# Patient Record
(demographics unavailable — no encounter records)

---

## 2024-12-02 NOTE — HISTORY OF PRESENT ILLNESS
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 Chronic headache disorder, tension-type.  Since last visit, after transitioning to LEV ER 750mg BID, no breakthrough seizures, but he says it causes drowsiness and upset stomach, he would like to try lower dose of 500mg BID, but would also request to switch to brand Keppra XR, because in the past generic levetiracetam causes similar adverse effects which had subsided when he switched to Keppra brand.  Current AEDs: LEV ER 750mg BID  Previous AEDs: Aptiom PHT VPA CBZ LEV OXC Xcopri ZNS (history of kidney stones) CLB LCM LTG BRV TPM (history of kidney stones)

## 2024-12-02 NOTE — HISTORY OF PRESENT ILLNESS
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 Chronic headache disorder, tension-type.  Since last visit, after transitioning to LEV ER 750mg BID, no breakthrough seizures, but he says it causes drowsiness and upset stomach, he would like to try lower dose of 500mg BID, but would also request to switch to brand Keppra XR, because in the past generic levetiracetam causes similar adverse effects which had subsided when he switched to Keppra brand.  Current AEDs: LEV ER 750mg BID  Previous AEDs: Aptiom PHT VPA CBZ LEV OXC Xcopri ZNS (history of kidney stones) CLB LCM LTG BRV TPM (history of kidney stones)

## 2024-12-02 NOTE — PHYSICAL EXAM
[FreeTextEntry1] : Awake, alert, oriented x 3. Language fluent. Comprehension intact. Naming intact. Repetition intact. Affect normal. Cranial nerves grossly intact. Motor exam: spastic left hemiparesis, LUE 3/5, LLE 4/5 with left foot drop (using AFO). RUE/RLE is 5/5. No tremors or fasciculations. Sensory exam: impaired LT in LUE/LLE. DTRs: 2+ in LUE/LLE, 1+ in RUE/RLE, no clonus. Coordination: no dysmetria. Gait: left hemiparetic gait. Romberg - negative.

## 2024-12-02 NOTE — ASSESSMENT
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 Chronic headache disorder, tension-type.  Improvement in seizure control on LEV ER 750mg BID, however, having some adverse effects, and reports tolerating Brand Keppra much better in the past.  Plan:  1. Trial of Keppra XR (brand) 500mg PO BID  2. Follow up with Shireen Reyes regarding headache prophylaxis  3. Seizure precautions, including no driving or operating heavy equipment, no unsupervised swimming, using a shower instead of a bathtub, no climbing ladders or working at elevations, no use of sharp dangerous tools or devices.  4. Patient will discuss presurgical evaluation with his family. Recommend video EEG monitoring in the EMU to record typical seizure, which is medically necessary to guide treatment and management.  5. Patient agrees with plan.  6. Follow up in 3 months.    Julián Amador MD  [facility], [facility]  I have spent 30 minutes of time for this encounter.  More than 50% of time spent counseling and educating patient about epilepsy specific safety issues including ASM side effects and interactions, alcohol consumption, sleep deprivation, risks and driving privileges associated with the [place] Guidelines, what is SUDEP and death related to seizures/SUDEP, seizure 1st aid and risks. Patient is educated on seizure precautions, including no driving, no operating machinery, no swimming or bathing, no climbing heights, or engage in any risky activities during which a seizure could cause further injury to pt or others.

## 2024-12-02 NOTE — ASSESSMENT
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 Chronic headache disorder, tension-type.  Improvement in seizure control on LEV ER 750mg BID, however, having some adverse effects, and reports tolerating Brand Keppra much better in the past.  Plan:  1. Trial of Keppra XR (brand) 500mg PO BID  2. Follow up with Shireen Reyes regarding headache prophylaxis  3. Seizure precautions, including no driving or operating heavy equipment, no unsupervised swimming, using a shower instead of a bathtub, no climbing ladders or working at elevations, no use of sharp dangerous tools or devices.  4. Patient will discuss presurgical evaluation with his family. Recommend video EEG monitoring in the EMU to record typical seizure, which is medically necessary to guide treatment and management.  5. Patient agrees with plan.  6. Follow up in 3 months.    Julián Amador MD  [facility], [facility]  I have spent 30 minutes of time for this encounter.  More than 50% of time spent counseling and educating patient about epilepsy specific safety issues including ASM side effects and interactions, alcohol consumption, sleep deprivation, risks and driving privileges associated with the [place] Guidelines, what is SUDEP and death related to seizures/SUDEP, seizure 1st aid and risks. Patient is educated on seizure precautions, including no driving, no operating machinery, no swimming or bathing, no climbing heights, or engage in any risky activities during which a seizure could cause further injury to pt or others.

## 2025-02-28 NOTE — HISTORY OF PRESENT ILLNESS
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 Chronic headache disorder, tension-type.  Since last visit, he was able to obtain brand Keppra XR, however, he reported fatigue and upset stomach from it, but this has improved since adding omeprazole.  He also was in the ER for his mother recently and was there very late into the night and had a seizure while in the ER, possibly related to sleep deprivation.  He had another breakthrough seizure this past Monday and went to [facility] ER, but no medication changes were made.  He feels that his headaches are not under control, and his headache leads to him having a seizure.  He feels that if his headache was better controlled he would not have seizures.  He is currently taking Qulipta 30mg daily which has been helpful but needs refills and will follow up with NOEMY Reyes.   He does not want to increase dose of Keppra XR until his headche is better controlled.  Current AEDs: Keppra XR (brand) 500mg BID  Previous AEDs: Aptiom PHT VPA CBZ LEV OXC Xcopri ZNS (history of kidney stones) CLB LCM LTG BRV TPM (history of kidney stones)

## 2025-02-28 NOTE — ASSESSMENT
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 Chronic headache disorder, tension-type.  Recent breakthrough seizures which patient believes are provoked by his chronic headaches.  Uncertain if this is accurate, but patient wants to focus on his headache management for now.  Plan:  1. Continue Keppra XR (brand) 500mg PO BID  2. Follow up with Shireen Reyes regarding headache prophylaxis, continue Qulipta 30mg PO daily, Rx provided.  3. Seizure precautions, including no driving or operating heavy equipment, no unsupervised swimming, using a shower instead of a bathtub, no climbing ladders or working at elevations, no use of sharp dangerous tools or devices.  4. Patient agrees with plan.  5. Follow up in 4 months.    Julián Amador MD  [facility]  I have spent 30 minutes of time for this encounter.  More than 50% of time spent counseling and educating patient about epilepsy specific safety issues including ASM side effects and interactions, alcohol consumption, sleep deprivation, risks and driving privileges associated with the [place] Guidelines, what is SUDEP and death related to seizures/SUDEP, seizure 1st aid and risks. Patient is educated on seizure precautions, including no driving, no operating machinery, no swimming or bathing, no climbing heights, or engage in any risky activities during which a seizure could cause further injury to pt or others.

## 2025-02-28 NOTE — ASSESSMENT
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 Chronic headache disorder, tension-type.  Recent breakthrough seizures which patient believes are provoked by his chronic headaches.  Uncertain if this is accurate, but patient wants to focus on his headache management for now.  Plan:  1. Continue Keppra XR (brand) 500mg PO BID  2. Follow up with Shireen Reyes regarding headache prophylaxis, continue Qulipta 30mg PO daily, Rx provided.  3. Seizure precautions, including no driving or operating heavy equipment, no unsupervised swimming, using a shower instead of a bathtub, no climbing ladders or working at elevations, no use of sharp dangerous tools or devices.  4. Patient agrees with plan.  5. Follow up in 4 months.    Julián Amador MD  [facility]  I have spent 30 minutes of time for this encounter.  More than 50% of time spent counseling and educating patient about epilepsy specific safety issues including ASM side effects and interactions, alcohol consumption, sleep deprivation, risks and driving privileges associated with the [place] Guidelines, what is SUDEP and death related to seizures/SUDEP, seizure 1st aid and risks. Patient is educated on seizure precautions, including no driving, no operating machinery, no swimming or bathing, no climbing heights, or engage in any risky activities during which a seizure could cause further injury to pt or others.

## 2025-02-28 NOTE — HISTORY OF PRESENT ILLNESS
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 Chronic headache disorder, tension-type.  Since last visit, he was able to obtain brand Keppra XR, however, he reported fatigue and upset stomach from it, but this has improved since adding omeprazole.  He also was in the ER for his mother recently and was there very late into the night and had a seizure while in the ER, possibly related to sleep deprivation.  He had another breakthrough seizure this past Monday and went to [facility] ER, but no medication changes were made.  He feels that his headaches are not under control, and his headache leads to him having a seizure.  He feels that if his headache was better controlled he would not have seizures.  He is currently taking Qulipta 30mg daily which has been helpful but needs refills and will follow up with NOEMY Reyes.   He does not want to increase dose of Keppra XR until his headche is better controlled.  Current AEDs: Keppra XR (brand) 500mg BID  Previous AEDs: Aptiom PHT VPA CBZ LEV OXC Xcopri ZNS (history of kidney stones) CLB LCM LTG BRV TPM (history of kidney stones)

## 2025-03-04 NOTE — ASSESSMENT
[FreeTextEntry1] : -Medical Annual wellness visit completed: -General Lab ordered waiting for results discussed with patient -HRA completed and reviewed with patient -Medical, family, surgical history reviewed with patient and updated -List of current providers r/w patient and updated -Vitals, BMI reviewed and discussed along with healthy BMI goals. Dietary counseling x 15 minutes provided -Depression PHQ 9 completed and reviewed -Annual safety assessment reviewed -discussed advanced directives  -smoking cessation counseling provided  -Established routine screening and immunization schedule  Time spent 42 minutes including counseling, charting, face-to-face encounter, chart review

## 2025-03-04 NOTE — PHYSICAL EXAM
[Normal] : no joint swelling and grossly normal strength and tone [de-identified] : Left upper extremity weakness, left lower extremity weakness as per patient status polio virus associated/CVA.  Nontender stage

## 2025-03-04 NOTE — HISTORY OF PRESENT ILLNESS
[FreeTextEntry1] : Annual wellness visit, to establish care [de-identified] : 54 years old male with past medical history of epilepsy, patient history of  stroke, taking Keppra 500 twice daily, under close neurologic care, patient compliance with medication and with follow-up, here today to establish care, and annual wellness visit exam.  Pt has no CP, SOB, COOPER, fever, Chills.  no new medical complaints. flu vaccinated Shingrix recs.  Patient is taking meloxicam 15 mg, Keppra 500 twice a day, Tylenol 501 every 8 hour, omeprazole 40 mg 1 daily, amlodipine 5 mgs  no needs refills. colonoscopy referral given.

## 2025-03-04 NOTE — COUNSELING
[Fall prevention counseling provided] : Fall prevention counseling provided [Adequate lighting] : Adequate lighting [No throw rugs] : No throw rugs [Use proper foot wear] : Use proper foot wear [Use recommended devices] : Use recommended devices [Behavioral health counseling provided] : Behavioral health counseling provided [Sleep ___ hours/day] : Sleep [unfilled] hours/day [Engage in a relaxing activity] : Engage in a relaxing activity [Plan in advance] : Plan in advance [None] : None [Good understanding] : Patient has a good understanding of lifestyle changes and steps needed to achieve self management goal [de-identified] : -Medical Annual wellness visit completed: -HRA completed and reviewed with patient -Medical, family, surgical history reviewed with patient and updated -List of current providers r/w patient and updated -Vitals, BMI reviewed and discussed along with healthy BMI goals. Dietary counseling x 15 minutes provided -Depression PHQ 9 completed and reviewed -Annual safety assessment reviewed -discussed advanced directives  -smoking cessation counseling provided  -Established routine screening and immunization schedule

## 2025-03-04 NOTE — HEALTH RISK ASSESSMENT
[Fair] :  ~his/her~ mood as fair [No] : No [Two or more falls in past year] : Patient reported two or more falls in the past year [0] : 2) Feeling down, depressed, or hopeless: Not at all (0) [PHQ-2 Negative - No further assessment needed] : PHQ-2 Negative - No further assessment needed [I have developed a follow-up plan documented below in the note.] : I have developed a follow-up plan documented below in the note. [Time Spent: ___ Minutes] : I spent [unfilled] minutes performing a depression screening for this patient. [Yes] : takes [Never] : Never [HIV test declined] : HIV test declined [Hepatitis C test declined] : Hepatitis C test declined [None] : None [With Family] : lives with family [Unemployed] : unemployed [Single] : single [Feels Safe at Home] : Feels safe at home [Fully functional (bathing, dressing, toileting, transferring, walking, feeding)] : Fully functional (bathing, dressing, toileting, transferring, walking, feeding) [Fully functional (using the telephone, shopping, preparing meals, housekeeping, doing laundry, using] : Fully functional and needs no help or supervision to perform IADLs (using the telephone, shopping, preparing meals, housekeeping, doing laundry, using transportation, managing medications and managing finances) [Reports normal functional visual acuity (ie: able to read med bottle)] : Reports normal functional visual acuity [Smoke Detector] : smoke detector [Carbon Monoxide Detector] : carbon monoxide detector [Safety elements used in home] : safety elements used in home [Seat Belt] :  uses seat belt [Sunscreen] : uses sunscreen [With Patient/Caregiver] : , with patient/caregiver [Designated Healthcare Proxy] : Designated healthcare proxy [Name: ___] : Health Care Proxy's Name: [unfilled]  [Relationship: ___] : Relationship: [unfilled] [Aggressive treatment] : aggressive treatment [I will adhere to the patient's wishes.] : I will adhere to the patient's wishes. [Time Spent: ___ minutes] : Time Spent: [unfilled] minutes [Change in mental status noted] : No change in mental status noted [Language] : denies difficulty with language [Behavior] : denies difficulty with behavior [Learning/Retaining New Information] : denies difficulty learning/retaining new information [Handling Complex Tasks] : denies difficulty handling complex tasks [Reasoning] : denies difficulty with reasoning [Spatial Ability and Orientation] : denies difficulty with spatial ability and orientation [Sexually Active] : not sexually active [Reports changes in hearing] : Reports no changes in hearing [Reports changes in dental health] : Reports no changes in dental health [Travel to Developing Areas] : does not  travel to developing areas [TB Exposure] : is not being exposed to tuberculosis [Caregiver Concerns] : does not have caregiver concerns [ColonoscopyComments] : referral given [AdvancecareDate] : 3/4/25 [FreeTextEntry4] : [phone]   Met with TAMARA AMARO, who was willing to discuss advance care planning.  Our advance care planning conversation included a discussion about:  1. The value and importance of advance care planning.  2. Experiences with loved ones who have been seriously ill or have .  3. Exploration of personal, cultural, or spiritual beliefs that might influence medical decisions.  4. Exploration of goals of care in the event of a sudden injury or illness.  5. Identification of a health care agent.  6. Review and update, or completion of, an advance directive.  Start time: ____________                    End time: ____________

## 2025-05-10 NOTE — HEALTH RISK ASSESSMENT
[Fair] :  ~his/her~ mood as fair [No] : No [Two or more falls in past year] : Patient reported two or more falls in the past year [0] : 2) Feeling down, depressed, or hopeless: Not at all (0) [PHQ-2 Negative - No further assessment needed] : PHQ-2 Negative - No further assessment needed [I have developed a follow-up plan documented below in the note.] : I have developed a follow-up plan documented below in the note. [Time Spent: ___ Minutes] : I spent [unfilled] minutes performing a depression screening for this patient. [With Patient/Caregiver] : , with patient/caregiver [Designated Healthcare Proxy] : Designated healthcare proxy [Name: ___] : Health Care Proxy's Name: [unfilled]  [Relationship: ___] : Relationship: [unfilled] [Aggressive treatment] : aggressive treatment [I will adhere to the patient's wishes.] : I will adhere to the patient's wishes. [Time Spent: ___ minutes] : Time Spent: [unfilled] minutes [Never] : Never [Yes] : takes [HIV test declined] : HIV test declined [Hepatitis C test declined] : Hepatitis C test declined [None] : None [With Family] : lives with family [Unemployed] : unemployed [Single] : single [Feels Safe at Home] : Feels safe at home [Fully functional (bathing, dressing, toileting, transferring, walking, feeding)] : Fully functional (bathing, dressing, toileting, transferring, walking, feeding) [Fully functional (using the telephone, shopping, preparing meals, housekeeping, doing laundry, using] : Fully functional and needs no help or supervision to perform IADLs (using the telephone, shopping, preparing meals, housekeeping, doing laundry, using transportation, managing medications and managing finances) [Reports normal functional visual acuity (ie: able to read med bottle)] : Reports normal functional visual acuity [Smoke Detector] : smoke detector [Carbon Monoxide Detector] : carbon monoxide detector [Safety elements used in home] : safety elements used in home [Seat Belt] :  uses seat belt [Sunscreen] : uses sunscreen [Change in mental status noted] : No change in mental status noted [Language] : denies difficulty with language [Behavior] : denies difficulty with behavior [Learning/Retaining New Information] : denies difficulty learning/retaining new information [Handling Complex Tasks] : denies difficulty handling complex tasks [Reasoning] : denies difficulty with reasoning [Spatial Ability and Orientation] : denies difficulty with spatial ability and orientation [Sexually Active] : not sexually active [Reports changes in hearing] : Reports no changes in hearing [Reports changes in dental health] : Reports no changes in dental health [Travel to Developing Areas] : does not  travel to developing areas [TB Exposure] : is not being exposed to tuberculosis [Caregiver Concerns] : does not have caregiver concerns [ColonoscopyComments] : referral given [AdvancecareDate] : 3/4/25 [FreeTextEntry4] : [phone]   Met with TAMARA AMARO, who was willing to discuss advance care planning.  Our advance care planning conversation included a discussion about:  1. The value and importance of advance care planning.  2. Experiences with loved ones who have been seriously ill or have .  3. Exploration of personal, cultural, or spiritual beliefs that might influence medical decisions.  4. Exploration of goals of care in the event of a sudden injury or illness.  5. Identification of a health care agent.  6. Review and update, or completion of, an advance directive.  Start time: ____________                    End time: ____________

## 2025-05-10 NOTE — PHYSICAL EXAM
[Normal] : no joint swelling and grossly normal strength and tone [de-identified] : Left upper extremity weakness, left lower extremity weakness as per patient status polio virus associated/CVA.  Nontender stage

## 2025-05-10 NOTE — COUNSELING
[Fall prevention counseling provided] : Fall prevention counseling provided [Adequate lighting] : Adequate lighting [No throw rugs] : No throw rugs [Use proper foot wear] : Use proper foot wear [Use recommended devices] : Use recommended devices [Behavioral health counseling provided] : Behavioral health counseling provided [Sleep ___ hours/day] : Sleep [unfilled] hours/day [Engage in a relaxing activity] : Engage in a relaxing activity [Plan in advance] : Plan in advance [None] : None [Good understanding] : Patient has a good understanding of lifestyle changes and steps needed to achieve self management goal [de-identified] : -Medical Annual wellness visit completed: -HRA completed and reviewed with patient -Medical, family, surgical history reviewed with patient and updated -List of current providers r/w patient and updated -Vitals, BMI reviewed and discussed along with healthy BMI goals. Dietary counseling x 15 minutes provided -Depression PHQ 9 completed and reviewed -Annual safety assessment reviewed -discussed advanced directives  -smoking cessation counseling provided  -Established routine screening and immunization schedule

## 2025-05-10 NOTE — HEALTH RISK ASSESSMENT
[Fair] :  ~his/her~ mood as fair [No] : No [Two or more falls in past year] : Patient reported two or more falls in the past year [0] : 2) Feeling down, depressed, or hopeless: Not at all (0) [PHQ-2 Negative - No further assessment needed] : PHQ-2 Negative - No further assessment needed [I have developed a follow-up plan documented below in the note.] : I have developed a follow-up plan documented below in the note. [Time Spent: ___ Minutes] : I spent [unfilled] minutes performing a depression screening for this patient. [With Patient/Caregiver] : , with patient/caregiver [Designated Healthcare Proxy] : Designated healthcare proxy [Name: ___] : Health Care Proxy's Name: [unfilled]  [Relationship: ___] : Relationship: [unfilled] [Aggressive treatment] : aggressive treatment [I will adhere to the patient's wishes.] : I will adhere to the patient's wishes. [Time Spent: ___ minutes] : Time Spent: [unfilled] minutes [Never] : Never [Yes] : takes [HIV test declined] : HIV test declined [Hepatitis C test declined] : Hepatitis C test declined [None] : None [With Family] : lives with family [Unemployed] : unemployed [Single] : single [Feels Safe at Home] : Feels safe at home [Fully functional (bathing, dressing, toileting, transferring, walking, feeding)] : Fully functional (bathing, dressing, toileting, transferring, walking, feeding) [Fully functional (using the telephone, shopping, preparing meals, housekeeping, doing laundry, using] : Fully functional and needs no help or supervision to perform IADLs (using the telephone, shopping, preparing meals, housekeeping, doing laundry, using transportation, managing medications and managing finances) [Reports normal functional visual acuity (ie: able to read med bottle)] : Reports normal functional visual acuity [Smoke Detector] : smoke detector [Carbon Monoxide Detector] : carbon monoxide detector [Safety elements used in home] : safety elements used in home [Seat Belt] :  uses seat belt [Sunscreen] : uses sunscreen [Change in mental status noted] : No change in mental status noted [Language] : denies difficulty with language [Behavior] : denies difficulty with behavior [Learning/Retaining New Information] : denies difficulty learning/retaining new information [Handling Complex Tasks] : denies difficulty handling complex tasks [Reasoning] : denies difficulty with reasoning [Spatial Ability and Orientation] : denies difficulty with spatial ability and orientation [Sexually Active] : not sexually active [Reports changes in hearing] : Reports no changes in hearing [Reports changes in dental health] : Reports no changes in dental health [Travel to Developing Areas] : does not  travel to developing areas [TB Exposure] : is not being exposed to tuberculosis [Caregiver Concerns] : does not have caregiver concerns [ColonoscopyComments] : referral given [AdvancecareDate] : 3/4/25 [FreeTextEntry4] : [phone]   Met with TAMARA AMARO, who was willing to discuss advance care planning.  Our advance care planning conversation included a discussion about:  1. The value and importance of advance care planning.  2. Experiences with loved ones who have been seriously ill or have .  3. Exploration of personal, cultural, or spiritual beliefs that might influence medical decisions.  4. Exploration of goals of care in the event of a sudden injury or illness.  5. Identification of a health care agent.  6. Review and update, or completion of, an advance directive.  Start time: ____________                    End time: ____________

## 2025-05-10 NOTE — COUNSELING
[Fall prevention counseling provided] : Fall prevention counseling provided [Adequate lighting] : Adequate lighting [No throw rugs] : No throw rugs [Use proper foot wear] : Use proper foot wear [Use recommended devices] : Use recommended devices [Behavioral health counseling provided] : Behavioral health counseling provided [Sleep ___ hours/day] : Sleep [unfilled] hours/day [Engage in a relaxing activity] : Engage in a relaxing activity [Plan in advance] : Plan in advance [None] : None [Good understanding] : Patient has a good understanding of lifestyle changes and steps needed to achieve self management goal [de-identified] : -Medical Annual wellness visit completed: -HRA completed and reviewed with patient -Medical, family, surgical history reviewed with patient and updated -List of current providers r/w patient and updated -Vitals, BMI reviewed and discussed along with healthy BMI goals. Dietary counseling x 15 minutes provided -Depression PHQ 9 completed and reviewed -Annual safety assessment reviewed -discussed advanced directives  -smoking cessation counseling provided  -Established routine screening and immunization schedule

## 2025-05-10 NOTE — HISTORY OF PRESENT ILLNESS
[FreeTextEntry1] : Annual wellness visit, to establish care [de-identified] : 54 years old male with past medical history of epilepsy, patient history of  stroke, taking Keppra 500 twice daily, under close neurologic care, patient compliance with medication and with follow-up, here today to establish care, and annual wellness visit exam.  Pt has no CP, SOB, COOPER, fever, Chills.  no new medical complaints. flu vaccinated Shingrix recs.  Patient is taking meloxicam 15 mg, Keppra 500 twice a day, Tylenol 501 every 8 hour, omeprazole 40 mg 1 daily, amlodipine 5 mgs  no needs refills. colonoscopy referral given.

## 2025-05-10 NOTE — HEALTH RISK ASSESSMENT
[Fair] :  ~his/her~ mood as fair [No] : No [Two or more falls in past year] : Patient reported two or more falls in the past year [0] : 2) Feeling down, depressed, or hopeless: Not at all (0) [PHQ-2 Negative - No further assessment needed] : PHQ-2 Negative - No further assessment needed [I have developed a follow-up plan documented below in the note.] : I have developed a follow-up plan documented below in the note. [Time Spent: ___ Minutes] : I spent [unfilled] minutes performing a depression screening for this patient. [With Patient/Caregiver] : , with patient/caregiver [Designated Healthcare Proxy] : Designated healthcare proxy [Name: ___] : Health Care Proxy's Name: [unfilled]  [Relationship: ___] : Relationship: [unfilled] [Aggressive treatment] : aggressive treatment [I will adhere to the patient's wishes.] : I will adhere to the patient's wishes. [Time Spent: ___ minutes] : Time Spent: [unfilled] minutes [Never] : Never [Yes] : takes [HIV test declined] : HIV test declined [Hepatitis C test declined] : Hepatitis C test declined [None] : None [With Family] : lives with family [Unemployed] : unemployed [Single] : single [Feels Safe at Home] : Feels safe at home [Fully functional (bathing, dressing, toileting, transferring, walking, feeding)] : Fully functional (bathing, dressing, toileting, transferring, walking, feeding) [Fully functional (using the telephone, shopping, preparing meals, housekeeping, doing laundry, using] : Fully functional and needs no help or supervision to perform IADLs (using the telephone, shopping, preparing meals, housekeeping, doing laundry, using transportation, managing medications and managing finances) [Reports normal functional visual acuity (ie: able to read med bottle)] : Reports normal functional visual acuity [Smoke Detector] : smoke detector [Carbon Monoxide Detector] : carbon monoxide detector [Safety elements used in home] : safety elements used in home [Seat Belt] :  uses seat belt [Sunscreen] : uses sunscreen [Change in mental status noted] : No change in mental status noted [Language] : denies difficulty with language [Behavior] : denies difficulty with behavior [Learning/Retaining New Information] : denies difficulty learning/retaining new information [Handling Complex Tasks] : denies difficulty handling complex tasks [Reasoning] : denies difficulty with reasoning [Spatial Ability and Orientation] : denies difficulty with spatial ability and orientation [Sexually Active] : not sexually active [Reports changes in hearing] : Reports no changes in hearing [Reports changes in dental health] : Reports no changes in dental health [Travel to Developing Areas] : does not  travel to developing areas [TB Exposure] : is not being exposed to tuberculosis [Caregiver Concerns] : does not have caregiver concerns [ColonoscopyComments] : referral given [AdvancecareDate] : 3/4/25 [FreeTextEntry4] : [phone]   Met with TAMARA AMARO, who was willing to discuss advance care planning.  Our advance care planning conversation included a discussion about:  1. The value and importance of advance care planning.  2. Experiences with loved ones who have been seriously ill or have .  3. Exploration of personal, cultural, or spiritual beliefs that might influence medical decisions.  4. Exploration of goals of care in the event of a sudden injury or illness.  5. Identification of a health care agent.  6. Review and update, or completion of, an advance directive.  Start time: ____________                    End time: ____________

## 2025-05-10 NOTE — HISTORY OF PRESENT ILLNESS
[FreeTextEntry1] : Annual wellness visit, to establish care [de-identified] : 54 years old male with past medical history of epilepsy, patient history of  stroke, taking Keppra 500 twice daily, under close neurologic care, patient compliance with medication and with follow-up, here today to establish care, and annual wellness visit exam.  Pt has no CP, SOB, COOPER, fever, Chills.  no new medical complaints. flu vaccinated Shingrix recs.  Patient is taking meloxicam 15 mg, Keppra 500 twice a day, Tylenol 501 every 8 hour, omeprazole 40 mg 1 daily, amlodipine 5 mgs  no needs refills. colonoscopy referral given.

## 2025-05-10 NOTE — PHYSICAL EXAM
[Normal] : no joint swelling and grossly normal strength and tone [de-identified] : Left upper extremity weakness, left lower extremity weakness as per patient status polio virus associated/CVA.  Nontender stage

## 2025-05-10 NOTE — HISTORY OF PRESENT ILLNESS
[FreeTextEntry1] : Annual wellness visit, to establish care [de-identified] : 54 years old male with past medical history of epilepsy, patient history of  stroke, taking Keppra 500 twice daily, under close neurologic care, patient compliance with medication and with follow-up, here today to establish care, and annual wellness visit exam.  Pt has no CP, SOB, COOPER, fever, Chills.  no new medical complaints. flu vaccinated Shingrix recs.  Patient is taking meloxicam 15 mg, Keppra 500 twice a day, Tylenol 501 every 8 hour, omeprazole 40 mg 1 daily, amlodipine 5 mgs  no needs refills. colonoscopy referral given.

## 2025-05-10 NOTE — PHYSICAL EXAM
[Normal] : no joint swelling and grossly normal strength and tone [de-identified] : Left upper extremity weakness, left lower extremity weakness as per patient status polio virus associated/CVA.  Nontender stage

## 2025-05-10 NOTE — COUNSELING
[Fall prevention counseling provided] : Fall prevention counseling provided [Adequate lighting] : Adequate lighting [No throw rugs] : No throw rugs [Use proper foot wear] : Use proper foot wear [Use recommended devices] : Use recommended devices [Behavioral health counseling provided] : Behavioral health counseling provided [Sleep ___ hours/day] : Sleep [unfilled] hours/day [Engage in a relaxing activity] : Engage in a relaxing activity [Plan in advance] : Plan in advance [None] : None [Good understanding] : Patient has a good understanding of lifestyle changes and steps needed to achieve self management goal [de-identified] : -Medical Annual wellness visit completed: -HRA completed and reviewed with patient -Medical, family, surgical history reviewed with patient and updated -List of current providers r/w patient and updated -Vitals, BMI reviewed and discussed along with healthy BMI goals. Dietary counseling x 15 minutes provided -Depression PHQ 9 completed and reviewed -Annual safety assessment reviewed -discussed advanced directives  -smoking cessation counseling provided  -Established routine screening and immunization schedule

## 2025-05-28 NOTE — END OF VISIT
[Time Spent: ___ minutes] : I have spent [unfilled] minutes of time on the encounter which excludes teaching and separately reported services. [FreeTextEntry4] :  This note was written by Nikita Cohen on 05/28/2025 actively solely Dominic Palm M.D. I, Nikita Cohen, am scribing for and in the presence of Dominic Palm M.D. in the following sections HISTORY OF PRESENT ILLNESS, PAST MEDICAL/FAMILY/SOCIAL HISTORY; REVIEW OF SYSTEMS; VITAL SIGNS; PHYSICAL EXAM; ASSESSMENT/PLAN.     All medical record entries made by this scribe at my, Dominic Plam M.D. direction and personally dictated by me on 05/28/2025. I personally performed the services described in the documentation, reviewed the documentation recorded by the scribe in my presence, and it accurately and completely records my words and actions.

## 2025-05-28 NOTE — PHYSICAL EXAM
[Normal Appearance] : normal appearance [Well Groomed] : well groomed [General Appearance - In No Acute Distress] : no acute distress [Edema] : no peripheral edema [Respiration, Rhythm And Depth] : normal respiratory rhythm and effort [Exaggerated Use Of Accessory Muscles For Inspiration] : no accessory muscle use [Abdomen Soft] : soft [Abdomen Tenderness] : non-tender [Costovertebral Angle Tenderness] : no ~M costovertebral angle tenderness [Urinary Bladder Findings] : the bladder was normal on palpation [Normal Station and Gait] : the gait and station were normal for the patient's age [] : no rash [No Focal Deficits] : no focal deficits [Oriented To Time, Place, And Person] : oriented to person, place, and time [Affect] : the affect was normal [Mood] : the mood was normal [No Palpable Adenopathy] : no palpable adenopathy [Chaperone Present] : A chaperone was present in the examining room during all aspects of the physical examination [FreeTextEntry2] :  Nikita Cohen

## 2025-05-28 NOTE — HISTORY OF PRESENT ILLNESS
[FreeTextEntry1] : 49 y/o male presenting for initial visit for left flank pain. Pt presented to Urgent care on 05/18/2020 for left flank pain. Pt had similar pain about 1 -2 years ago. Was treated at [facility]. Found to have swelling of left kidney. Treated with analgetics and cranberry juice. Subsequently pain subsided. Follow up renal sonogram (as per pt) showed normal kidney. Did not pass stone.   At this time pt had CT Abdomen: left-sided hydronephrosis and hydroureter to the level of the distal left ureter. No definite obstructing stone is seen. Pt still has pain. Describes pain as intermittent; managed with Tylenol. Denies dysuria. Denies fever, nausea, diarrhea. Pt has bilateral CVA tenderness, more on the left than right. No bladder tenderness. Bed side renal sono: normal right kidney with Left hydronephrosis.  In view of persistent left renal colic, microscopic hematuria, no stone on noncontrast CT scan,  will get CT urogram. If pain gets worse or if pt develops fever or chills, will come to ER and may require left ureteroscopy to evaluate left hydronephrosis.  Will get UA, culture, CBC and BMP  09/02/2021:  Renal sonogram, CT urogram:  no stones, mild left pyelectasia secondary to mild left UPJ obstruction.  Pain is not related to mild left UPJ. RTC in 3 months for follow up for epididymal cyst and UPJ obstruction.  01/13/2022: Mr. Amaro is a 51 year old male presenting today for a follow up for UPJ obstruction. Denies gross hematuria and pain in the left side. He has some flank pain on his right side. His neurologist recently put him on a new medication for seizures, but he does not remember the name.  On exam, Uncircumcised phallus, adequate meatus.   Renal US today showed mild bilateral hydronephrosis. No parenchymal thinning. UC and UA completed today. Urine dip showed hematuria.  Plan: UPJ Obstruction: UA and UC today. Hematuria: Return to the office in 2 weeks for cystoscopy.  The patient will return to the office in 2 weeks.     10/12/2022: Mr. AMARO is a 52 year male who presents today for CT scan finding done at [facility] because pt sustained a fall.  CT abdomen report states renal cyst and partially collapsed bladder.   He went to [facility] on Oct. 6th and was advised to see a Urologist. CT scan of abdomen pelvis w/ iv contrast was done. Findings: Simple cyst noted about periphery of kidney and right hepatic lobe. There are no stones in the kidney. Renal cyst has been present in the past. He had CT scan abdomen and pelvis 04/22 at [facility] with similar findings. Explained t the pt in detail about renal cyst.    Mr. Amaro still had Microhematuria, was advised to get a Cystoscopy done to make sure there are no polyps. He did not keep the appointment.  Stressed again and made an appointment in a month for the same. .  He is urinating well. Flow is good. PVR: 45 mL     11/16/2022: Mr. AMARO is a 52 year male who presents today for a follow up for microhematuria. He is here for Cystoscopy with possible biopsy and fulguration today.  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 Microhematuria from the prostate. .   RTC: 6 months Follow up : UA, Culture, Uro -flow, and PVR     05/30/2023: Mr. AMARO is a 53 year male who presents today for a follow up for Enlarged prostate with LUTS and microhematuria   Enlarged prostate with LUTS : Pt. stating he has no urological issues. Voiding and emptying bladder well. 4 cc. Denies hematuria, dysuria, urgency and hesitancy.   Microhematuria:  from prostate. CT scan: no mass except a renal cyst.  and cystoscopy all normal. Will f/u in 1 year   RTC: 1 year Follow up : UA, Culture, Uro -flow, and PVR       10/26/2023: 53 year old male who presents today for right flank pain and testicular pain  Right side testicular pain and flank pain has been going on for one week.  Pain starts from abdomen, and is radiating to right side flank and down to testicle. Pain is not aggravated by movements. No N/V.  H/O hydronephrosis and UPJ obstruction Will get renal sonogram and testicular sonogram today.   renal sonogram :  Bilateral kidneys demonstrate fullness, No change from previous sonogram. Both kidneys are normal in size, without stones, or solid masses visualized, there previously.   testicular sonogram :  Impression: Sonographic appearance of the testes: homogeneous Hydrocele: none Spermatocele: none Varicocele: none Intratesticular calcifications: none Resistive index: wnl Testicular masses: none Epididymis: 3 x 2 mm epi cyst seen on the left side with no flow. Pain is not related to kidneys or testes. Pt could not give urine specimen. And denies urinary symptoms.  If not relieved in one week, must follow with his PMD.   RTC: 3 months:  UA, culture, uroflow and bladder scan     05/28/2024: 54 year old male presents for a follow up visit for Enlarged Prostate with LUTS and right sided flank and testicular pain.  Enlarged prostate with LUTS: Pt doing well. PVR today: 65 cc.without any meds for prostate.   CT scan in 2022: No hydronephrosis or stones noted.  Pt unable to flow today. PVR today: 65 cc  Mild, irregular epigastric pain and testicular pain:. He also reports abdominal pain. Advised pt his right sided pain is likely muscle related and his abdominal pain is not kidney related.  PMHx: chronic headaches, seizures  Urinalysis and culture done today. RTO in 1 year for follow up visit: UA, culture, Uroflow, and PVR.     05/28/2025, 54 y/o male presents with a follow up visit for BPH and Left Flank Pain PMHx Metabolic Syndrome, CVA  Enlarged Prostate  No medications at present. Doing well. denies any urinary issues at this time  Uroflow: Unable to flow   PVR 3cc  Patient is voiding and emptying his bladder well. Doing well. No Intervention necessary.  PSA 05/28/24: 0.65 ng/mL  03/04/25: 0.17 ng/mL  Stable   Patient will continue to draw PSA once a year by PCP   Left Flank Pain: H/o Mild Left UPJ Obstruction and Hydronephrosis in 2021 2021 CT Urogram:  No stones, mild left pyelectasia secondary to mild left UPJ obstruction.  2022 CT found a simple cyst of right kidney, no mass   Renal Sonogram today to evaluate kidneys  Findings: Today the right kidney appears unremarkable. The left kidney demonstrates renal fullness. Both kidneys are normal in size and echogenicity without stones, or solid masses visualized. No significant changes. Bladder: 38 cc prostate 25 cc  Patient informed flank pain is not of kidney origin as there is no obstruction. Pain is likely of Musculoskeletal origin. No intervention necessary at this this time.  He is advised follow with PCP for further evaluation and pain management.   RTC PRN, 1 year  follow up for visit PSA, Uroflow, PVR, and UA and Culture

## 2025-05-28 NOTE — LETTER BODY
[Dear  ___] : Dear  [unfilled], [Consult Letter:] : I had the pleasure of evaluating your patient, [unfilled]. [Please see my note below.] : Please see my note below. [Consult Closing:] : Thank you very much for allowing me to participate in the care of this patient.  If you have any questions, please do not hesitate to contact me. [Sincerely,] : Sincerely, [FreeTextEntry3] : Dominic Cano MD\par

## 2025-06-04 NOTE — HISTORY OF PRESENT ILLNESS
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 Chronic headache disorder, tension-type.  Today he is reporting that the Keppra XR is upsetting his stomach and he can not tolerate it, he agrees to try lower dose of 250mg BID (standard release), but he has to take brand only because he does not tolerate the generic form (causes upset stomach).  Current AEDs: Keppra XR (brand) 500mg BID  Previous AEDs: Aptiom PHT VPA CBZ LEV OXC Xcopri ZNS (history of kidney stones) CLB LCM LTG BRV TPM (history of kidney stones)

## 2025-06-04 NOTE — ASSESSMENT
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 Chronic headache disorder, tension-type.  Reporting adverse effects from Keppra XR, says it is too strong and upsets his stomach, agreeable to trying lower dose.  Plan:  1. Trial of Keppra (brand) 250mg PO BID  2. Follow up with Shireen Reyes regarding headache prophylaxis, continue Qulipta 30mg PO daily, Rx provided.  3. Seizure precautions, including no driving or operating heavy equipment, no unsupervised swimming, using a shower instead of a bathtub, no climbing ladders or working at elevations, no use of sharp dangerous tools or devices.  4. Patient agrees with plan.  5. Follow up in 3 months.    Julián Amador MD  [facility]  I have spent 30 minutes of time for this encounter.  More than 50% of time spent counseling and educating patient about epilepsy specific safety issues including ASM side effects and interactions, alcohol consumption, sleep deprivation, risks and driving privileges associated with the [place] Guidelines, what is SUDEP and death related to seizures/SUDEP, seizure 1st aid and risks. Patient is educated on seizure precautions, including no driving, no operating machinery, no swimming or bathing, no climbing heights, or engage in any risky activities during which a seizure could cause further injury to pt or others.

## 2025-07-18 NOTE — HEALTH RISK ASSESSMENT
[Fair] :  ~his/her~ mood as fair [No] : No [Two or more falls in past year] : Patient reported two or more falls in the past year [0] : 2) Feeling down, depressed, or hopeless: Not at all (0) [PHQ-2 Negative - No further assessment needed] : PHQ-2 Negative - No further assessment needed [I have developed a follow-up plan documented below in the note.] : I have developed a follow-up plan documented below in the note. [Time Spent: ___ Minutes] : I spent [unfilled] minutes performing a depression screening for this patient. [With Patient/Caregiver] : , with patient/caregiver [Designated Healthcare Proxy] : Designated healthcare proxy [Name: ___] : Health Care Proxy's Name: [unfilled]  [Relationship: ___] : Relationship: [unfilled] [Aggressive treatment] : aggressive treatment [I will adhere to the patient's wishes.] : I will adhere to the patient's wishes. [Time Spent: ___ minutes] : Time Spent: [unfilled] minutes [Never] : Never [Yes] : takes [HIV test declined] : HIV test declined [Hepatitis C test declined] : Hepatitis C test declined [None] : None [With Family] : lives with family [Unemployed] : unemployed [Single] : single [Feels Safe at Home] : Feels safe at home [Fully functional (bathing, dressing, toileting, transferring, walking, feeding)] : Fully functional (bathing, dressing, toileting, transferring, walking, feeding) [Fully functional (using the telephone, shopping, preparing meals, housekeeping, doing laundry, using] : Fully functional and needs no help or supervision to perform IADLs (using the telephone, shopping, preparing meals, housekeeping, doing laundry, using transportation, managing medications and managing finances) [Reports normal functional visual acuity (ie: able to read med bottle)] : Reports normal functional visual acuity [Smoke Detector] : smoke detector [Carbon Monoxide Detector] : carbon monoxide detector [Safety elements used in home] : safety elements used in home [Seat Belt] :  uses seat belt [Sunscreen] : uses sunscreen [AdvancecareDate] : 3/4/25 [FreeTextEntry4] : [phone]   Met with TAMARA AMARO, who was willing to discuss advance care planning.  Our advance care planning conversation included a discussion about:  1. The value and importance of advance care planning.  2. Experiences with loved ones who have been seriously ill or have .  3. Exploration of personal, cultural, or spiritual beliefs that might influence medical decisions.  4. Exploration of goals of care in the event of a sudden injury or illness.  5. Identification of a health care agent.  6. Review and update, or completion of, an advance directive.  Start time: ____________                    End time: ____________  [Change in mental status noted] : No change in mental status noted [Language] : denies difficulty with language [Behavior] : denies difficulty with behavior [Learning/Retaining New Information] : denies difficulty learning/retaining new information [Handling Complex Tasks] : denies difficulty handling complex tasks [Reasoning] : denies difficulty with reasoning [Spatial Ability and Orientation] : denies difficulty with spatial ability and orientation [Sexually Active] : not sexually active [Reports changes in hearing] : Reports no changes in hearing [Reports changes in dental health] : Reports no changes in dental health [Travel to Developing Areas] : does not  travel to developing areas [TB Exposure] : is not being exposed to tuberculosis [Caregiver Concerns] : does not have caregiver concerns [ColonoscopyComments] : referral given

## 2025-07-18 NOTE — PHYSICAL EXAM
[Normal] : no joint swelling and grossly normal strength and tone [de-identified] : Left upper extremity weakness, left lower extremity weakness as per patient status polio virus associated/CVA.  Nontender stage

## 2025-07-18 NOTE — HISTORY OF PRESENT ILLNESS
[FreeTextEntry1] : f/u [de-identified] : 55 55-year-old male with a history of epilepsy (on Keppra),  stroke, and migraines presents for follow-up. He reports compliance with Keppra 500 mg BID and follows regularly with neurology. No recent seizures. Migraines are generally controlled with Qulipta; the patient requests a refill. States' preference for naproxen instead of meloxicam for breakthrough headaches. Denies blurred vision, new neurological symptoms, chest pain, shortness of breath, fever, or chills.

## 2025-07-18 NOTE — HISTORY OF PRESENT ILLNESS
[FreeTextEntry1] : f/u [de-identified] : 55 55-year-old male with a history of epilepsy (on Keppra),  stroke, and migraines presents for follow-up. He reports compliance with Keppra 500 mg BID and follows regularly with neurology. No recent seizures. Migraines are generally controlled with Qulipta; the patient requests a refill. States' preference for naproxen instead of meloxicam for breakthrough headaches. Denies blurred vision, new neurological symptoms, chest pain, shortness of breath, fever, or chills.

## 2025-07-18 NOTE — COUNSELING
[Fall prevention counseling provided] : Fall prevention counseling provided [Adequate lighting] : Adequate lighting [No throw rugs] : No throw rugs [Use proper foot wear] : Use proper foot wear [Use recommended devices] : Use recommended devices [Behavioral health counseling provided] : Behavioral health counseling provided [Sleep ___ hours/day] : Sleep [unfilled] hours/day [Engage in a relaxing activity] : Engage in a relaxing activity [Plan in advance] : Plan in advance [None] : None [Good understanding] : Patient has a good understanding of lifestyle changes and steps needed to achieve self management goal [de-identified] : -Medical Annual wellness visit completed: -HRA completed and reviewed with patient -Medical, family, surgical history reviewed with patient and updated -List of current providers r/w patient and updated -Vitals, BMI reviewed and discussed along with healthy BMI goals. Dietary counseling x 15 minutes provided -Depression PHQ 9 completed and reviewed -Annual safety assessment reviewed -discussed advanced directives  -smoking cessation counseling provided  -Established routine screening and immunization schedule

## 2025-07-18 NOTE — ASSESSMENT
[FreeTextEntry1] :  Plan: 	-	Continue Keppra 500 mg PO BID; compliance reinforced. 	-	Refill Qulipta for migraine prevention. 	-	Switch to Naproxen 500 mg PO BID PRN for breakthrough headache (avoid meloxicam as per patient preference). 	-	Prediabetes: Encourage low-carb diet, exercise 150 min/week, weight optimization; repeat A1c in 3-6 months. 	-	Hyperlipidemia: Recommend heart-healthy diet (low saturated fats), regular exercise; LDL goal <100 mg/dL. Discuss statin if not currently on. 	-	Vaccines: Flu vaccine up-to-date; Shingrix recommended (2 doses). 	-	Lifestyle: Smoking cessation if applicable, moderate alcohol intake. 	-	Follow-up: 6 months for chronic care or sooner if migraine frequency increases or seizure activity recurs. 	-	Education: Advised on warning signs (seizures, severe headache, neuro deficits)  ER immediately.  â¸» Discussed the importance and benefit of a healthy lifestyle, including a heart-healthy diet such as the Mediterranean diet, regular exercise, and 7 hours of sleep nightly.  Total time 30 min ( 20 min. FTF and 10 min chart review and documentation.)

## 2025-07-18 NOTE — HEALTH RISK ASSESSMENT
[Fair] :  ~his/her~ mood as fair [No] : No [Two or more falls in past year] : Patient reported two or more falls in the past year [0] : 2) Feeling down, depressed, or hopeless: Not at all (0) [PHQ-2 Negative - No further assessment needed] : PHQ-2 Negative - No further assessment needed [I have developed a follow-up plan documented below in the note.] : I have developed a follow-up plan documented below in the note. [Time Spent: ___ Minutes] : I spent [unfilled] minutes performing a depression screening for this patient. [With Patient/Caregiver] : , with patient/caregiver [Designated Healthcare Proxy] : Designated healthcare proxy [Name: ___] : Health Care Proxy's Name: [unfilled]  [Relationship: ___] : Relationship: [unfilled] [Aggressive treatment] : aggressive treatment [I will adhere to the patient's wishes.] : I will adhere to the patient's wishes. [Time Spent: ___ minutes] : Time Spent: [unfilled] minutes [Never] : Never [Yes] : takes [HIV test declined] : HIV test declined [Hepatitis C test declined] : Hepatitis C test declined [None] : None [With Family] : lives with family [Unemployed] : unemployed [Single] : single [Feels Safe at Home] : Feels safe at home [Fully functional (bathing, dressing, toileting, transferring, walking, feeding)] : Fully functional (bathing, dressing, toileting, transferring, walking, feeding) [Fully functional (using the telephone, shopping, preparing meals, housekeeping, doing laundry, using] : Fully functional and needs no help or supervision to perform IADLs (using the telephone, shopping, preparing meals, housekeeping, doing laundry, using transportation, managing medications and managing finances) [Reports normal functional visual acuity (ie: able to read med bottle)] : Reports normal functional visual acuity [Smoke Detector] : smoke detector [Carbon Monoxide Detector] : carbon monoxide detector [Safety elements used in home] : safety elements used in home [Seat Belt] :  uses seat belt [Sunscreen] : uses sunscreen [AdvancecareDate] : 3/4/25 [FreeTextEntry4] : [phone]   Met with TAMARA AMARO, who was willing to discuss advance care planning.  Our advance care planning conversation included a discussion about:  1. The value and importance of advance care planning.  2. Experiences with loved ones who have been seriously ill or have .  3. Exploration of personal, cultural, or spiritual beliefs that might influence medical decisions.  4. Exploration of goals of care in the event of a sudden injury or illness.  5. Identification of a health care agent.  6. Review and update, or completion of, an advance directive.  Start time: ____________                    End time: ____________  [Change in mental status noted] : No change in mental status noted [Language] : denies difficulty with language [Behavior] : denies difficulty with behavior [Learning/Retaining New Information] : denies difficulty learning/retaining new information [Handling Complex Tasks] : denies difficulty handling complex tasks [Reasoning] : denies difficulty with reasoning [Spatial Ability and Orientation] : denies difficulty with spatial ability and orientation [Sexually Active] : not sexually active [Reports changes in hearing] : Reports no changes in hearing [Reports changes in dental health] : Reports no changes in dental health [Travel to Developing Areas] : does not  travel to developing areas [TB Exposure] : is not being exposed to tuberculosis [Caregiver Concerns] : does not have caregiver concerns [ColonoscopyComments] : referral given

## 2025-07-18 NOTE — COUNSELING
[Fall prevention counseling provided] : Fall prevention counseling provided [Adequate lighting] : Adequate lighting [No throw rugs] : No throw rugs [Use proper foot wear] : Use proper foot wear [Use recommended devices] : Use recommended devices [Behavioral health counseling provided] : Behavioral health counseling provided [Sleep ___ hours/day] : Sleep [unfilled] hours/day [Engage in a relaxing activity] : Engage in a relaxing activity [Plan in advance] : Plan in advance [None] : None [Good understanding] : Patient has a good understanding of lifestyle changes and steps needed to achieve self management goal [de-identified] : -Medical Annual wellness visit completed: -HRA completed and reviewed with patient -Medical, family, surgical history reviewed with patient and updated -List of current providers r/w patient and updated -Vitals, BMI reviewed and discussed along with healthy BMI goals. Dietary counseling x 15 minutes provided -Depression PHQ 9 completed and reviewed -Annual safety assessment reviewed -discussed advanced directives  -smoking cessation counseling provided  -Established routine screening and immunization schedule

## 2025-07-18 NOTE — PHYSICAL EXAM
[Normal] : no joint swelling and grossly normal strength and tone [de-identified] : Left upper extremity weakness, left lower extremity weakness as per patient status polio virus associated/CVA.  Nontender stage